# Patient Record
Sex: MALE | Race: ASIAN | NOT HISPANIC OR LATINO | Employment: FULL TIME | URBAN - METROPOLITAN AREA
[De-identification: names, ages, dates, MRNs, and addresses within clinical notes are randomized per-mention and may not be internally consistent; named-entity substitution may affect disease eponyms.]

---

## 2017-01-05 ENCOUNTER — RECORDS - HEALTHEAST (OUTPATIENT)
Dept: LAB | Facility: CLINIC | Age: 52
End: 2017-01-05

## 2017-01-05 LAB
CHOLEST SERPL-MCNC: 243 MG/DL
FASTING STATUS PATIENT QL REPORTED: YES
HDLC SERPL-MCNC: 38 MG/DL
LDLC SERPL CALC-MCNC: 186 MG/DL
TRIGL SERPL-MCNC: 95 MG/DL

## 2018-02-16 ENCOUNTER — RECORDS - HEALTHEAST (OUTPATIENT)
Dept: LAB | Facility: CLINIC | Age: 53
End: 2018-02-16

## 2018-02-16 LAB
CHOLEST SERPL-MCNC: 258 MG/DL
FASTING STATUS PATIENT QL REPORTED: YES
HDLC SERPL-MCNC: 41 MG/DL
LDLC SERPL CALC-MCNC: 197 MG/DL
PSA SERPL-MCNC: 0.6 NG/ML (ref 0–3.5)
TRIGL SERPL-MCNC: 98 MG/DL

## 2019-04-23 ENCOUNTER — RECORDS - HEALTHEAST (OUTPATIENT)
Dept: ADMINISTRATIVE | Facility: OTHER | Age: 54
End: 2019-04-23

## 2019-12-27 ENCOUNTER — RECORDS - HEALTHEAST (OUTPATIENT)
Dept: LAB | Facility: CLINIC | Age: 54
End: 2019-12-27

## 2019-12-27 LAB
ALBUMIN SERPL-MCNC: 4.3 G/DL (ref 3.5–5)
ALP SERPL-CCNC: 85 U/L (ref 45–120)
ALT SERPL W P-5'-P-CCNC: 31 U/L (ref 0–45)
ANION GAP SERPL CALCULATED.3IONS-SCNC: 11 MMOL/L (ref 5–18)
AST SERPL W P-5'-P-CCNC: 24 U/L (ref 0–40)
BILIRUB SERPL-MCNC: 0.6 MG/DL (ref 0–1)
BUN SERPL-MCNC: 10 MG/DL (ref 8–22)
CALCIUM SERPL-MCNC: 9.3 MG/DL (ref 8.5–10.5)
CHLORIDE BLD-SCNC: 106 MMOL/L (ref 98–107)
CHOLEST SERPL-MCNC: 203 MG/DL
CO2 SERPL-SCNC: 23 MMOL/L (ref 22–31)
CREAT SERPL-MCNC: 0.76 MG/DL (ref 0.7–1.3)
FASTING STATUS PATIENT QL REPORTED: YES
GFR SERPL CREATININE-BSD FRML MDRD: >60 ML/MIN/1.73M2
GLUCOSE BLD-MCNC: 83 MG/DL (ref 70–125)
HDLC SERPL-MCNC: 39 MG/DL
LDLC SERPL CALC-MCNC: 151 MG/DL
POTASSIUM BLD-SCNC: 4.8 MMOL/L (ref 3.5–5)
PROT SERPL-MCNC: 6.7 G/DL (ref 6–8)
SODIUM SERPL-SCNC: 140 MMOL/L (ref 136–145)
TRIGL SERPL-MCNC: 63 MG/DL

## 2021-02-01 ENCOUNTER — RECORDS - HEALTHEAST (OUTPATIENT)
Dept: LAB | Facility: CLINIC | Age: 56
End: 2021-02-01

## 2021-02-01 LAB
ALBUMIN SERPL-MCNC: 4.2 G/DL (ref 3.5–5)
ALP SERPL-CCNC: 88 U/L (ref 45–120)
ALT SERPL W P-5'-P-CCNC: 23 U/L (ref 0–45)
ANION GAP SERPL CALCULATED.3IONS-SCNC: 9 MMOL/L (ref 5–18)
AST SERPL W P-5'-P-CCNC: 25 U/L (ref 0–40)
BILIRUB SERPL-MCNC: 0.3 MG/DL (ref 0–1)
BUN SERPL-MCNC: 10 MG/DL (ref 8–22)
CALCIUM SERPL-MCNC: 8.7 MG/DL (ref 8.5–10.5)
CHLORIDE BLD-SCNC: 106 MMOL/L (ref 98–107)
CHOLEST SERPL-MCNC: 197 MG/DL
CO2 SERPL-SCNC: 23 MMOL/L (ref 22–31)
CREAT SERPL-MCNC: 0.73 MG/DL (ref 0.7–1.3)
FASTING STATUS PATIENT QL REPORTED: YES
GFR SERPL CREATININE-BSD FRML MDRD: >60 ML/MIN/1.73M2
GLUCOSE BLD-MCNC: 88 MG/DL (ref 70–125)
HDLC SERPL-MCNC: 37 MG/DL
LDLC SERPL CALC-MCNC: 148 MG/DL
POTASSIUM BLD-SCNC: 4 MMOL/L (ref 3.5–5)
PROT SERPL-MCNC: 6.5 G/DL (ref 6–8)
PSA SERPL-MCNC: 0.5 NG/ML (ref 0–3.5)
SODIUM SERPL-SCNC: 138 MMOL/L (ref 136–145)
TRIGL SERPL-MCNC: 58 MG/DL

## 2021-02-02 LAB — 25(OH)D3 SERPL-MCNC: 35.3 NG/ML (ref 30–80)

## 2021-04-09 ENCOUNTER — IMMUNIZATION (OUTPATIENT)
Dept: NURSING | Facility: CLINIC | Age: 56
End: 2021-04-09
Payer: COMMERCIAL

## 2021-04-09 PROCEDURE — 0001A PR COVID VAC PFIZER DIL RECON 30 MCG/0.3 ML IM: CPT

## 2021-04-09 PROCEDURE — 91300 PR COVID VAC PFIZER DIL RECON 30 MCG/0.3 ML IM: CPT

## 2021-04-30 ENCOUNTER — IMMUNIZATION (OUTPATIENT)
Dept: NURSING | Facility: CLINIC | Age: 56
End: 2021-04-30
Attending: INTERNAL MEDICINE
Payer: COMMERCIAL

## 2021-04-30 PROCEDURE — 0002A PR COVID VAC PFIZER DIL RECON 30 MCG/0.3 ML IM: CPT

## 2021-04-30 PROCEDURE — 91300 PR COVID VAC PFIZER DIL RECON 30 MCG/0.3 ML IM: CPT

## 2021-05-01 ENCOUNTER — HEALTH MAINTENANCE LETTER (OUTPATIENT)
Age: 56
End: 2021-05-01

## 2021-05-29 ENCOUNTER — RECORDS - HEALTHEAST (OUTPATIENT)
Dept: ADMINISTRATIVE | Facility: CLINIC | Age: 56
End: 2021-05-29

## 2021-06-16 PROBLEM — E78.00 HYPERCHOLESTEROLEMIA: Status: ACTIVE | Noted: 2019-04-18

## 2021-06-16 PROBLEM — Z86.59 HISTORY OF DEPRESSION: Status: ACTIVE | Noted: 2019-04-18

## 2021-10-11 ENCOUNTER — HEALTH MAINTENANCE LETTER (OUTPATIENT)
Age: 56
End: 2021-10-11

## 2022-05-22 ENCOUNTER — HEALTH MAINTENANCE LETTER (OUTPATIENT)
Age: 57
End: 2022-05-22

## 2022-06-13 PROCEDURE — 80061 LIPID PANEL: CPT | Mod: ORL | Performed by: FAMILY MEDICINE

## 2022-06-13 PROCEDURE — 82306 VITAMIN D 25 HYDROXY: CPT | Mod: ORL | Performed by: FAMILY MEDICINE

## 2022-06-13 PROCEDURE — 80053 COMPREHEN METABOLIC PANEL: CPT | Performed by: FAMILY MEDICINE

## 2022-06-13 PROCEDURE — 82310 ASSAY OF CALCIUM: CPT | Performed by: FAMILY MEDICINE

## 2022-06-13 PROCEDURE — G0103 PSA SCREENING: HCPCS | Mod: ORL | Performed by: FAMILY MEDICINE

## 2022-06-13 PROCEDURE — 80053 COMPREHEN METABOLIC PANEL: CPT | Mod: ORL | Performed by: FAMILY MEDICINE

## 2022-06-13 PROCEDURE — G0103 PSA SCREENING: HCPCS | Performed by: FAMILY MEDICINE

## 2022-06-13 PROCEDURE — 82306 VITAMIN D 25 HYDROXY: CPT | Performed by: FAMILY MEDICINE

## 2022-06-14 ENCOUNTER — LAB REQUISITION (OUTPATIENT)
Dept: LAB | Facility: CLINIC | Age: 57
End: 2022-06-14
Payer: COMMERCIAL

## 2022-06-14 DIAGNOSIS — Z12.5 ENCOUNTER FOR SCREENING FOR MALIGNANT NEOPLASM OF PROSTATE: ICD-10-CM

## 2022-06-14 DIAGNOSIS — E78.5 HYPERLIPIDEMIA, UNSPECIFIED: ICD-10-CM

## 2022-06-14 DIAGNOSIS — Z13.21 ENCOUNTER FOR SCREENING FOR NUTRITIONAL DISORDER: ICD-10-CM

## 2022-06-14 LAB
ALBUMIN SERPL-MCNC: 4.3 G/DL (ref 3.5–5)
ALP SERPL-CCNC: 84 U/L (ref 45–120)
ALT SERPL W P-5'-P-CCNC: 20 U/L (ref 0–45)
ANION GAP SERPL CALCULATED.3IONS-SCNC: 13 MMOL/L (ref 5–18)
AST SERPL W P-5'-P-CCNC: 21 U/L (ref 0–40)
BILIRUB SERPL-MCNC: 0.7 MG/DL (ref 0–1)
BUN SERPL-MCNC: 12 MG/DL (ref 8–22)
CALCIUM SERPL-MCNC: 9.3 MG/DL (ref 8.5–10.5)
CHLORIDE BLD-SCNC: 105 MMOL/L (ref 98–107)
CO2 SERPL-SCNC: 22 MMOL/L (ref 22–31)
CREAT SERPL-MCNC: 0.73 MG/DL (ref 0.7–1.3)
DEPRECATED CALCIDIOL+CALCIFEROL SERPL-MC: 19 UG/L (ref 20–75)
GFR SERPL CREATININE-BSD FRML MDRD: >90 ML/MIN/1.73M2
GLUCOSE BLD-MCNC: 85 MG/DL (ref 70–125)
POTASSIUM BLD-SCNC: 3.9 MMOL/L (ref 3.5–5)
PROT SERPL-MCNC: 7.1 G/DL (ref 6–8)
PSA SERPL-MCNC: 0.51 UG/L (ref 0–3.5)
SODIUM SERPL-SCNC: 140 MMOL/L (ref 136–145)

## 2022-06-17 ENCOUNTER — LAB REQUISITION (OUTPATIENT)
Dept: LAB | Facility: CLINIC | Age: 57
End: 2022-06-17
Payer: COMMERCIAL

## 2022-06-17 DIAGNOSIS — E78.5 HYPERLIPIDEMIA, UNSPECIFIED: ICD-10-CM

## 2022-06-17 LAB
CHOLEST SERPL-MCNC: 227 MG/DL
HDLC SERPL-MCNC: 45 MG/DL
LDLC SERPL CALC-MCNC: 163 MG/DL
TRIGL SERPL-MCNC: 94 MG/DL

## 2022-09-25 ENCOUNTER — HEALTH MAINTENANCE LETTER (OUTPATIENT)
Age: 57
End: 2022-09-25

## 2023-03-24 ENCOUNTER — LAB REQUISITION (OUTPATIENT)
Dept: LAB | Facility: CLINIC | Age: 58
End: 2023-03-24
Payer: COMMERCIAL

## 2023-03-24 DIAGNOSIS — E55.9 VITAMIN D DEFICIENCY, UNSPECIFIED: ICD-10-CM

## 2023-03-24 DIAGNOSIS — E78.5 HYPERLIPIDEMIA, UNSPECIFIED: ICD-10-CM

## 2023-03-24 LAB
ALBUMIN SERPL BCG-MCNC: 4.8 G/DL (ref 3.5–5.2)
ALP SERPL-CCNC: 92 U/L (ref 40–129)
ALT SERPL W P-5'-P-CCNC: 50 U/L (ref 10–50)
ANION GAP SERPL CALCULATED.3IONS-SCNC: 13 MMOL/L (ref 7–15)
AST SERPL W P-5'-P-CCNC: 33 U/L (ref 10–50)
BILIRUB SERPL-MCNC: 0.6 MG/DL
BUN SERPL-MCNC: 12.4 MG/DL (ref 6–20)
CALCIUM SERPL-MCNC: 9.5 MG/DL (ref 8.6–10)
CHLORIDE SERPL-SCNC: 104 MMOL/L (ref 98–107)
CHOLEST SERPL-MCNC: 149 MG/DL
CREAT SERPL-MCNC: 0.75 MG/DL (ref 0.67–1.17)
DEPRECATED HCO3 PLAS-SCNC: 24 MMOL/L (ref 22–29)
GFR SERPL CREATININE-BSD FRML MDRD: >90 ML/MIN/1.73M2
GLUCOSE SERPL-MCNC: 88 MG/DL (ref 70–99)
HDLC SERPL-MCNC: 43 MG/DL
LDLC SERPL CALC-MCNC: 88 MG/DL
NONHDLC SERPL-MCNC: 106 MG/DL
POTASSIUM SERPL-SCNC: 4 MMOL/L (ref 3.4–5.3)
PROT SERPL-MCNC: 7.1 G/DL (ref 6.4–8.3)
SODIUM SERPL-SCNC: 141 MMOL/L (ref 136–145)
TRIGL SERPL-MCNC: 92 MG/DL

## 2023-03-24 PROCEDURE — 80053 COMPREHEN METABOLIC PANEL: CPT | Mod: ORL | Performed by: FAMILY MEDICINE

## 2023-03-24 PROCEDURE — 80061 LIPID PANEL: CPT | Mod: ORL | Performed by: FAMILY MEDICINE

## 2023-03-24 PROCEDURE — 82306 VITAMIN D 25 HYDROXY: CPT | Mod: ORL | Performed by: FAMILY MEDICINE

## 2023-03-25 LAB — DEPRECATED CALCIDIOL+CALCIFEROL SERPL-MC: 23 UG/L (ref 20–75)

## 2023-06-04 ENCOUNTER — HEALTH MAINTENANCE LETTER (OUTPATIENT)
Age: 58
End: 2023-06-04

## 2023-08-18 ENCOUNTER — TRANSFERRED RECORDS (OUTPATIENT)
Dept: HEALTH INFORMATION MANAGEMENT | Facility: CLINIC | Age: 58
End: 2023-08-18

## 2023-12-19 ENCOUNTER — LAB REQUISITION (OUTPATIENT)
Dept: LAB | Facility: CLINIC | Age: 58
End: 2023-12-19
Payer: COMMERCIAL

## 2023-12-19 DIAGNOSIS — Z13.29 ENCOUNTER FOR SCREENING FOR OTHER SUSPECTED ENDOCRINE DISORDER: ICD-10-CM

## 2023-12-19 LAB
T4 FREE SERPL-MCNC: 1.1 NG/DL (ref 0.9–1.7)
TSH SERPL DL<=0.005 MIU/L-ACNC: 9.11 UIU/ML (ref 0.3–4.2)

## 2023-12-19 PROCEDURE — 84443 ASSAY THYROID STIM HORMONE: CPT | Mod: ORL | Performed by: FAMILY MEDICINE

## 2023-12-19 PROCEDURE — 84439 ASSAY OF FREE THYROXINE: CPT | Mod: ORL | Performed by: FAMILY MEDICINE

## 2024-02-12 ENCOUNTER — LAB REQUISITION (OUTPATIENT)
Dept: LAB | Facility: CLINIC | Age: 59
End: 2024-02-12
Payer: COMMERCIAL

## 2024-02-12 DIAGNOSIS — E03.8 OTHER SPECIFIED HYPOTHYROIDISM: ICD-10-CM

## 2024-02-12 DIAGNOSIS — E55.9 VITAMIN D DEFICIENCY, UNSPECIFIED: ICD-10-CM

## 2024-02-12 DIAGNOSIS — M79.18 MYALGIA, OTHER SITE: ICD-10-CM

## 2024-02-12 LAB
ANION GAP SERPL CALCULATED.3IONS-SCNC: 11 MMOL/L (ref 7–15)
BUN SERPL-MCNC: 9.4 MG/DL (ref 6–20)
CALCIUM SERPL-MCNC: 9.2 MG/DL (ref 8.6–10)
CHLORIDE SERPL-SCNC: 104 MMOL/L (ref 98–107)
CREAT SERPL-MCNC: 0.71 MG/DL (ref 0.67–1.17)
DEPRECATED HCO3 PLAS-SCNC: 25 MMOL/L (ref 22–29)
EGFRCR SERPLBLD CKD-EPI 2021: >90 ML/MIN/1.73M2
GLUCOSE SERPL-MCNC: 69 MG/DL (ref 70–99)
MAGNESIUM SERPL-MCNC: 2.3 MG/DL (ref 1.7–2.3)
POTASSIUM SERPL-SCNC: 3.9 MMOL/L (ref 3.4–5.3)
SODIUM SERPL-SCNC: 140 MMOL/L (ref 135–145)
T4 FREE SERPL-MCNC: 1.6 NG/DL (ref 0.9–1.7)
TSH SERPL DL<=0.005 MIU/L-ACNC: 2.12 UIU/ML (ref 0.3–4.2)
VIT B12 SERPL-MCNC: 1057 PG/ML (ref 232–1245)
VIT D+METAB SERPL-MCNC: 27 NG/ML (ref 20–50)

## 2024-02-12 PROCEDURE — 80048 BASIC METABOLIC PNL TOTAL CA: CPT | Mod: ORL | Performed by: FAMILY MEDICINE

## 2024-02-12 PROCEDURE — 82306 VITAMIN D 25 HYDROXY: CPT | Mod: ORL | Performed by: FAMILY MEDICINE

## 2024-02-12 PROCEDURE — 83735 ASSAY OF MAGNESIUM: CPT | Mod: ORL | Performed by: FAMILY MEDICINE

## 2024-02-12 PROCEDURE — 82607 VITAMIN B-12: CPT | Mod: ORL | Performed by: FAMILY MEDICINE

## 2024-02-12 PROCEDURE — 84439 ASSAY OF FREE THYROXINE: CPT | Mod: ORL | Performed by: FAMILY MEDICINE

## 2024-02-12 PROCEDURE — 84443 ASSAY THYROID STIM HORMONE: CPT | Mod: ORL | Performed by: FAMILY MEDICINE

## 2024-03-11 ENCOUNTER — TRANSFERRED RECORDS (OUTPATIENT)
Dept: HEALTH INFORMATION MANAGEMENT | Facility: CLINIC | Age: 59
End: 2024-03-11
Payer: COMMERCIAL

## 2024-03-15 ENCOUNTER — MEDICAL CORRESPONDENCE (OUTPATIENT)
Dept: HEALTH INFORMATION MANAGEMENT | Facility: CLINIC | Age: 59
End: 2024-03-15
Payer: COMMERCIAL

## 2024-03-18 ENCOUNTER — TRANSCRIBE ORDERS (OUTPATIENT)
Dept: OTHER | Age: 59
End: 2024-03-18

## 2024-03-18 DIAGNOSIS — G51.39 FACIAL SPASM: Primary | ICD-10-CM

## 2024-03-21 ENCOUNTER — LAB REQUISITION (OUTPATIENT)
Dept: LAB | Facility: CLINIC | Age: 59
End: 2024-03-21
Payer: COMMERCIAL

## 2024-03-21 DIAGNOSIS — F41.1 GENERALIZED ANXIETY DISORDER: ICD-10-CM

## 2024-03-21 PROCEDURE — 80053 COMPREHEN METABOLIC PANEL: CPT | Mod: ORL | Performed by: FAMILY MEDICINE

## 2024-03-22 LAB
ALBUMIN SERPL BCG-MCNC: 4.6 G/DL (ref 3.5–5.2)
ALP SERPL-CCNC: 85 U/L (ref 40–150)
ALT SERPL W P-5'-P-CCNC: 37 U/L (ref 0–70)
ANION GAP SERPL CALCULATED.3IONS-SCNC: 13 MMOL/L (ref 7–15)
AST SERPL W P-5'-P-CCNC: 27 U/L (ref 0–45)
BILIRUB SERPL-MCNC: 0.4 MG/DL
BUN SERPL-MCNC: 10 MG/DL (ref 6–20)
CALCIUM SERPL-MCNC: 8.9 MG/DL (ref 8.6–10)
CHLORIDE SERPL-SCNC: 103 MMOL/L (ref 98–107)
CREAT SERPL-MCNC: 0.69 MG/DL (ref 0.67–1.17)
DEPRECATED HCO3 PLAS-SCNC: 24 MMOL/L (ref 22–29)
EGFRCR SERPLBLD CKD-EPI 2021: >90 ML/MIN/1.73M2
GLUCOSE SERPL-MCNC: 78 MG/DL (ref 70–99)
POTASSIUM SERPL-SCNC: 3.7 MMOL/L (ref 3.4–5.3)
PROT SERPL-MCNC: 6.9 G/DL (ref 6.4–8.3)
SODIUM SERPL-SCNC: 140 MMOL/L (ref 135–145)

## 2024-03-30 NOTE — PROGRESS NOTES
NEUROLOGY CONSULTATION NOTE       [facility] [place]  [address] [place] [address]  Tel: [phone]  Fax: [phone]  [email or web address]     Augusto Moon,  1965, MRN [id number]  PCP: Kingsley Cesar  Date: 2024     ASSESSMENT & PLAN     Visit Diagnosis   Benign fasciculations  Chronic tension-type headache, not intractable     Chronic tension-type headache  58 years old male with history of HLD, depression, head injury who was referred for evaluation of headache along with eye fasciculation.  He clearly has chronic tension type headache due to his underlying depression/anxiety resulting in fasciculations also.  I have reassured him he does not have any major neurological illness but given his history of head injury with encephalomalacia noted on recent MRI scan I have recommended:    1.  Sleep deprived EEG  2.  Start Effexor 37.5 mg daily.  I will titrate the dose depending on his response  3.  Follow-up the day scheduled for EEG    Thank you again for this referral, please feel free to contact me if you have any questions.    Ulices Larsen MD  [facility], [place]     REASON FOR CONSULTATION Facial/eye Spasm Evaluation        HISTORY OF PRESENT ILLNESS     We have been requested by Dr. Cesar to evaluate Augusto Moon who is a 58 year old  male for facial spasm    Patient is a 58 years old [place] male with history of HLD, depression, head injury who was referred for evaluation of pressure-like sensation in the back of the head along with left facial twitches.  According to patient while in [place] years ago he had a fall resulting in subdural and epidural hematoma.  This was managed conservatively.  He eventually immigrated to [place].  He was on anticonvulsant for short duration that he discontinued.    Recently he started having increased right-sided pressure-like sensation usually brought on by working on the computer.  He also  noticed some tingling sensation on the left forehead along with twitching in the upper eyelid.  He took riboflavin and magnesium that did help but continues to have intermittent twitches on the left eyelid.  He denies any tonic-clonic activity, focal weakness or numbness.  There is no history of any aura or increased weakness in the arms or legs.  Due to ongoing symptoms he had CT of the head and neck that showed no recent ischemia.  Chronic encephalomalacia along the lateral left frontal and temporal lobes was noted likely due to the old trauma.  MRI brain also confirmed encephalomalacia involving the inferior lateral left frontal lobe and ventrolateral left temporal lobe due to previous trauma.  Additionally patient had scattered areas of punctate white matter T2 hyperintensity throughout the cerebral hemisphere     PROBLEM LIST   Patient Active Problem List   Diagnosis Code     Depression with anxiety F41.8     Hypercholesterolemia E78.00     Chronic tension-type headache, not intractable G44.229         PAST MEDICAL & SURGICAL HISTORY     Past Medical History:   Patient  has no past medical history on file.    Surgical History:  He  has no past surgical history on file.     SOCIAL HISTORY     Reviewed, and he  reports that he has never smoked. He does not have any smokeless tobacco history on file. He reports that he does not currently use alcohol.     FAMILY HISTORY     Reviewed, and family history is not on file.     ALLERGIES     Allergies   Allergen Reactions     Sulfa Antibiotics Rash         REVIEW OF SYSTEMS     A 12 point review of system was performed and was negative except as outlined in the history of present illness.     HOME MEDICATIONS     Current Outpatient Rx   Medication Sig Dispense Refill     Cyanocobalamin (VITAMIN B12 PO)        levothyroxine (SYNTHROID/LEVOTHROID) 50 MCG tablet Take 50 mcg by mouth daily       Omega-3 Fatty Acids (FISH OIL PO)        rosuvastatin (CRESTOR) 5 MG tablet Take  "5 mg by mouth daily       venlafaxine (EFFEXOR XR) 37.5 MG 24 hr capsule Take 1 capsule (37.5 mg) by mouth daily 30 capsule 6         PHYSICAL EXAM     Vital signs  /87 (BP Location: Left arm, Patient Position: Sitting)   Pulse 83   Ht 1.651 m (5' 5\")   Wt 63.5 kg (140 lb)   BMI 23.30 kg/m      Weight:   140 lbs 0 oz    Middle-age male who is alert and oriented somewhat varied vital signs are reviewed and documented in electronic medical record.  Neck supple.  Neurologically speech was normal.  Cranial nerves II through XII are intact.  Motor strength 5/5.  Reflexes 2+ toes downgoing.  No dysmetria noted on finger-nose testing.  Gait normal.  Romberg negative.     PERTINENT DIAGNOSTIC STUDIES     Following studies were reviewed:     CTA HEAD AND NECK 8/12/2023  HEAD CT:   1.  No intracranial hemorrhage, mass, or definite CT evidence of recent ischemia.   2.  Chronic encephalomalacia along the lateral left frontal and temporal lobes may reflect the sequela of old trauma.     HEAD CTA:   1.  Negative. No vessel stenosis, occlusion or aneurysm.     NECK CTA:   1.  Negative. No dissection or hemodynamically significant narrowing in the neck by NASCET criteria.     MRI BRAIN 8/28/2023  1. Unchanged areas of macrocystic encephalomalacia involving the inferolateral left frontal lobe and ventrolateral left temporal lobe as detailed above, likely the chronic sequelae of prior trauma.    2. No intracranial mass, acute intracranial hemorrhage or acute or subacute infarction.    3. Scattered areas of punctate white matter T2 hyperintensity throughout the remainder the cerebral hemispheres are nonspecific, but likely reflect mild migraine hepatic change.    4. Mild bilateral maxillary sinus, bilateral sphenoid sinus, bilateral ethmoid air cell and left frontal sinus mucosal thickening with a right maxillary sinus mucous retention cysts.    5. Upper cervical spinal degenerative spondylosis.    MRI THORACIC SPINE " 5/5/2018  1. Mild to moderate multilevel thoracic disc degeneration with moderate discogenic marrow edema at T8-9.    2. No disc herniation or facet arthropathy, and no stenosis or impingement.    3. No cord abnormality, and no neoplasm, fracture or infection.    MRI LUMBAR SPINE 5/5/2018  1. Mild to moderate multilevel lumbar disc degeneration with endplate irregularities and discogenic marrow edema at L4-5.    2. Central canal patent at each level with subarticular recess stenosis on the left at L3-4.    3. No intradural abnormality, and no neoplasm, fracture or infection.         PERTINENT LABS  Following labs were reviewed:  Lab Requisition on 03/21/2024   Component Date Value Ref Range Status     Sodium 03/21/2024 140  135 - 145 mmol/L Final     Potassium 03/21/2024 3.7  3.4 - 5.3 mmol/L Final     Carbon Dioxide (CO2) 03/21/2024 24  22 - 29 mmol/L Final     Anion Gap 03/21/2024 13  7 - 15 mmol/L Final     Urea Nitrogen 03/21/2024 10.0  6.0 - 20.0 mg/dL Final     Creatinine 03/21/2024 0.69  0.67 - 1.17 mg/dL Final     GFR Estimate 03/21/2024 >90  >60 mL/min/1.73m2 Final     Calcium 03/21/2024 8.9  8.6 - 10.0 mg/dL Final     Chloride 03/21/2024 103  98 - 107 mmol/L Final     Glucose 03/21/2024 78  70 - 99 mg/dL Final     Alkaline Phosphatase 03/21/2024 85  40 - 150 U/L Final     AST 03/21/2024 27  0 - 45 U/L Final     ALT 03/21/2024 37  0 - 70 U/L Final     Protein Total 03/21/2024 6.9  6.4 - 8.3 g/dL Final     Albumin 03/21/2024 4.6  3.5 - 5.2 g/dL Final     Bilirubin Total 03/21/2024 0.4  <=1.2 mg/dL Final   Lab Requisition on 02/12/2024   Component Date Value Ref Range Status     Sodium 02/12/2024 140  135 - 145 mmol/L Final     Potassium 02/12/2024 3.9  3.4 - 5.3 mmol/L Final     Chloride 02/12/2024 104  98 - 107 mmol/L Final     Carbon Dioxide (CO2) 02/12/2024 25  22 - 29 mmol/L Final     Anion Gap 02/12/2024 11  7 - 15 mmol/L Final     Urea Nitrogen 02/12/2024 9.4  6.0 - 20.0 mg/dL Final     Creatinine  02/12/2024 0.71  0.67 - 1.17 mg/dL Final     GFR Estimate 02/12/2024 >90  >60 mL/min/1.73m2 Final     Calcium 02/12/2024 9.2  8.6 - 10.0 mg/dL Final     Glucose 02/12/2024 69 (L)  70 - 99 mg/dL Final     Magnesium 02/12/2024 2.3  1.7 - 2.3 mg/dL Final     TSH 02/12/2024 2.12  0.30 - 4.20 uIU/mL Final     Free T4 02/12/2024 1.60  0.90 - 1.70 ng/dL Final     Vitamin B12 02/12/2024 1,057  232 - 1,245 pg/mL Final     Vitamin D, Total (25-Hydroxy) 02/12/2024 27  20 - 50 ng/mL Final        Total time spent for face to face visit, reviewing labs/imaging studies, counseling and coordination of care was: 1 Hour spent on the date of the encounter doing chart review, review of outside records, review of test results, interpretation of tests, patient visit, and documentation     This note was dictated using voice recognition software.  Any grammatical or context distortions are unintentional and inherent to the software.    Orders Placed This Encounter   Procedures     EEG      New Prescriptions    VENLAFAXINE (EFFEXOR XR) 37.5 MG 24 HR CAPSULE    Take 1 capsule (37.5 mg) by mouth daily      Modified Medications    No medications on file

## 2024-04-01 ENCOUNTER — OFFICE VISIT (OUTPATIENT)
Dept: NEUROLOGY | Facility: CLINIC | Age: 59
End: 2024-04-01
Attending: FAMILY MEDICINE
Payer: COMMERCIAL

## 2024-04-01 VITALS
DIASTOLIC BLOOD PRESSURE: 87 MMHG | HEART RATE: 83 BPM | HEIGHT: 65 IN | BODY MASS INDEX: 23.32 KG/M2 | SYSTOLIC BLOOD PRESSURE: 130 MMHG | WEIGHT: 140 LBS

## 2024-04-01 DIAGNOSIS — G44.229 CHRONIC TENSION-TYPE HEADACHE, NOT INTRACTABLE: ICD-10-CM

## 2024-04-01 DIAGNOSIS — R25.3 BENIGN FASCICULATIONS: Primary | ICD-10-CM

## 2024-04-01 PROBLEM — F41.8 DEPRESSION WITH ANXIETY: Status: ACTIVE | Noted: 2019-04-18

## 2024-04-01 PROCEDURE — 99205 OFFICE O/P NEW HI 60 MIN: CPT | Performed by: PSYCHIATRY & NEUROLOGY

## 2024-04-01 RX ORDER — VENLAFAXINE HYDROCHLORIDE 37.5 MG/1
37.5 CAPSULE, EXTENDED RELEASE ORAL DAILY
Qty: 30 CAPSULE | Refills: 6 | Status: SHIPPED | OUTPATIENT
Start: 2024-04-01 | End: 2024-05-28

## 2024-04-01 RX ORDER — ROSUVASTATIN CALCIUM 5 MG/1
5 TABLET, COATED ORAL DAILY
COMMUNITY
Start: 2023-03-27

## 2024-04-01 RX ORDER — LEVOTHYROXINE SODIUM 50 UG/1
50 TABLET ORAL DAILY
COMMUNITY

## 2024-04-01 NOTE — LETTER
2024         RE: Augusto Moon  [address] [place] [address]        Dear Colleague,    Thank you for referring your patient, Augusto Moon, to the [facility] [place]. Please see a copy of my visit note below.    NEUROLOGY CONSULTATION NOTE       [facility] [place]  [address] [place] [address]  Tel: [phone]  Fax: [phone]  [email or web address]     Augusto Moon,  1965, MRN [id number]  PCP: Kingsley Cesar  Date: 2024     ASSESSMENT & PLAN     Visit Diagnosis   Benign fasciculations  Chronic tension-type headache, not intractable     Chronic tension-type headache  58 years old male with history of HLD, depression, head injury who was referred for evaluation of headache along with eye fasciculation.  He clearly has chronic tension type headache due to his underlying depression/anxiety resulting in fasciculations also.  I have reassured him he does not have any major neurological illness but given his history of head injury with encephalomalacia noted on recent MRI scan I have recommended:    1.  Sleep deprived EEG  2.  Start Effexor 37.5 mg daily.  I will titrate the dose depending on his response  3.  Follow-up the day scheduled for EEG    Thank you again for this referral, please feel free to contact me if you have any questions.    Uilces Larsen MD  [facility], [place]     REASON FOR CONSULTATION Facial/eye Spasm Evaluation        HISTORY OF PRESENT ILLNESS     We have been requested by Dr. Cesar to evaluate Augusto Moon who is a 58 year old  male for facial spasm    Patient is a 58 years old [place]er male with history of HLD, depression, head injury who was referred for evaluation of pressure-like sensation in the back of the head along with left facial twitches.  According to patient while in [place] years ago he had a fall resulting in subdural and epidural hematoma.  This was managed  conservatively.  He eventually immig[place] to [place].  He was on anticonvulsant for short duration that he discontinued.    Recently he started having increased right-sided pressure-like sensation usually brought on by working on the computer.  He also noticed some tingling sensation on the left forehead along with twitching in the upper eyelid.  He took riboflavin and magnesium that did help but continues to have intermittent twitches on the left eyelid.  He denies any tonic-clonic activity, focal weakness or numbness.  There is no history of any aura or increased weakness in the arms or legs.  Due to ongoing symptoms he had CT of the head and neck that showed no recent ischemia.  Chronic encephalomalacia along the lateral left frontal and temporal lobes was noted likely due to the old trauma.  MRI brain also confirmed encephalomalacia involving the inferior lateral left frontal lobe and ventrolateral left temporal lobe due to previous trauma.  Additionally patient had scattered areas of punctate white matter T2 hyperintensity throughout the cerebral hemisphere     PROBLEM LIST   Patient Active Problem List   Diagnosis Code     Depression with anxiety F41.8     Hypercholesterolemia E78.00     Chronic tension-type headache, not intractable G44.229         PAST MEDICAL & SURGICAL HISTORY     Past Medical History:   Patient  has no past medical history on file.    Surgical History:  He  has no past surgical history on file.     SOCIAL HISTORY     Reviewed, and he  reports that he has never smoked. He does not have any smokeless tobacco history on file. He reports that he does not currently use alcohol.     FAMILY HISTORY     Reviewed, and family history is not on file.     ALLERGIES     Allergies   Allergen Reactions     Sulfa Antibiotics Rash         REVIEW OF SYSTEMS     A 12 point review of system was performed and was negative except as outlined in the history of present illness.     HOME MEDICATIONS  "    Current Outpatient Rx   Medication Sig Dispense Refill     Cyanocobalamin (VITAMIN B12 PO)        levothyroxine (SYNTHROID/LEVOTHROID) 50 MCG tablet Take 50 mcg by mouth daily       Omega-3 Fatty Acids (FISH OIL PO)        rosuvastatin (CRESTOR) 5 MG tablet Take 5 mg by mouth daily       venlafaxine (EFFEXOR XR) 37.5 MG 24 hr capsule Take 1 capsule (37.5 mg) by mouth daily 30 capsule 6         PHYSICAL EXAM     Vital signs  /87 (BP Location: Left arm, Patient Position: Sitting)   Pulse 83   Ht 1.651 m (5' 5\")   Wt 63.5 kg (140 lb)   BMI 23.30 kg/m      Weight:   140 lbs 0 oz    Middle-age male who is alert and oriented somewhat varied vital signs are reviewed and documented in electronic medical record.  Neck supple.  Neurologically speech was normal.  Cranial nerves II through XII are intact.  Motor strength 5/5.  Reflexes 2+ toes downgoing.  No dysmetria noted on finger-nose testing.  Gait normal.  Romberg negative.     PERTINENT DIAGNOSTIC STUDIES     Following studies were reviewed:     CTA HEAD AND NECK 8/12/2023  HEAD CT:   1.  No intracranial hemorrhage, mass, or definite CT evidence of recent ischemia.   2.  Chronic encephalomalacia along the lateral left frontal and temporal lobes may reflect the sequela of old trauma.     HEAD CTA:   1.  Negative. No vessel stenosis, occlusion or aneurysm.     NECK CTA:   1.  Negative. No dissection or hemodynamically significant narrowing in the neck by NASCET criteria.     MRI BRAIN 8/28/2023  1. Unchanged areas of macrocystic encephalomalacia involving the inferolateral left frontal lobe and ventrolateral left temporal lobe as detailed above, likely the chronic sequelae of prior trauma.    2. No intracranial mass, acute intracranial hemorrhage or acute or subacute infarction.    3. Scattered areas of punctate white matter T2 hyperintensity throughout the remainder the cerebral hemispheres are nonspecific, but likely reflect mild migraine hepatic " change.    4. Mild bilateral maxillary sinus, bilateral sphenoid sinus, bilateral ethmoid air cell and left frontal sinus mucosal thickening with a right maxillary sinus mucous retention cysts.    5. Upper cervical spinal degenerative spondylosis.    MRI THORACIC SPINE 5/5/2018  1. Mild to moderate multilevel thoracic disc degeneration with moderate discogenic marrow edema at T8-9.    2. No disc herniation or facet arthropathy, and no stenosis or impingement.    3. No cord abnormality, and no neoplasm, fracture or infection.    MRI LUMBAR SPINE 5/5/2018  1. Mild to moderate multilevel lumbar disc degeneration with endplate irregularities and discogenic marrow edema at L4-5.    2. Central canal patent at each level with subarticular recess stenosis on the left at L3-4.    3. No intradural abnormality, and no neoplasm, fracture or infection.         PERTINENT LABS  Following labs were reviewed:  Lab Requisition on 03/21/2024   Component Date Value Ref Range Status     Sodium 03/21/2024 140  135 - 145 mmol/L Final     Potassium 03/21/2024 3.7  3.4 - 5.3 mmol/L Final     Carbon Dioxide (CO2) 03/21/2024 24  22 - 29 mmol/L Final     Anion Gap 03/21/2024 13  7 - 15 mmol/L Final     Urea Nitrogen 03/21/2024 10.0  6.0 - 20.0 mg/dL Final     Creatinine 03/21/2024 0.69  0.67 - 1.17 mg/dL Final     GFR Estimate 03/21/2024 >90  >60 mL/min/1.73m2 Final     Calcium 03/21/2024 8.9  8.6 - 10.0 mg/dL Final     Chloride 03/21/2024 103  98 - 107 mmol/L Final     Glucose 03/21/2024 78  70 - 99 mg/dL Final     Alkaline Phosphatase 03/21/2024 85  40 - 150 U/L Final     AST 03/21/2024 27  0 - 45 U/L Final     ALT 03/21/2024 37  0 - 70 U/L Final     Protein Total 03/21/2024 6.9  6.4 - 8.3 g/dL Final     Albumin 03/21/2024 4.6  3.5 - 5.2 g/dL Final     Bilirubin Total 03/21/2024 0.4  <=1.2 mg/dL Final   Lab Requisition on 02/12/2024   Component Date Value Ref Range Status     Sodium 02/12/2024 140  135 - 145 mmol/L Final     Potassium  02/12/2024 3.9  3.4 - 5.3 mmol/L Final     Chloride 02/12/2024 104  98 - 107 mmol/L Final     Carbon Dioxide (CO2) 02/12/2024 25  22 - 29 mmol/L Final     Anion Gap 02/12/2024 11  7 - 15 mmol/L Final     Urea Nitrogen 02/12/2024 9.4  6.0 - 20.0 mg/dL Final     Creatinine 02/12/2024 0.71  0.67 - 1.17 mg/dL Final     GFR Estimate 02/12/2024 >90  >60 mL/min/1.73m2 Final     Calcium 02/12/2024 9.2  8.6 - 10.0 mg/dL Final     Glucose 02/12/2024 69 (L)  70 - 99 mg/dL Final     Magnesium 02/12/2024 2.3  1.7 - 2.3 mg/dL Final     TSH 02/12/2024 2.12  0.30 - 4.20 uIU/mL Final     Free T4 02/12/2024 1.60  0.90 - 1.70 ng/dL Final     Vitamin B12 02/12/2024 1,057  232 - 1,245 pg/mL Final     Vitamin D, Total (25-Hydroxy) 02/12/2024 27  20 - 50 ng/mL Final        Total time spent for face to face visit, reviewing labs/imaging studies, counseling and coordination of care was: 1 Hour spent on the date of the encounter doing chart review, review of outside records, review of test results, interpretation of tests, patient visit, and documentation     This note was dictated using voice recognition software.  Any grammatical or context distortions are unintentional and inherent to the software.    Orders Placed This Encounter   Procedures     EEG      New Prescriptions    VENLAFAXINE (EFFEXOR XR) 37.5 MG 24 HR CAPSULE    Take 1 capsule (37.5 mg) by mouth daily      Modified Medications    No medications on file                Again, thank you for allowing me to participate in the care of your patient.        Sincerely,        Ulices Larsen MD

## 2024-04-01 NOTE — NURSING NOTE
Chief Complaint   Patient presents with    Facial/eye Spasm Evaluation     Patient reports left sided spasm in  the eye regions. Complains of sharp head pain on  the right side. Had 1-2 episodes of severe head pain that occurred at night and had to rest with an ice pack- he notices this when he is concentrating too hard. 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 Patient reports he did start acupuncture  about 3 weeks ago- not sure if this has been beneficial for him     Carmela Shukla CMA on 4/1/2024 at 2:01 PM  [facility]

## 2024-04-02 ENCOUNTER — ANCILLARY PROCEDURE (OUTPATIENT)
Dept: NEUROLOGY | Facility: CLINIC | Age: 59
End: 2024-04-02
Attending: PSYCHIATRY & NEUROLOGY
Payer: COMMERCIAL

## 2024-04-02 DIAGNOSIS — R25.3 BENIGN FASCICULATIONS: ICD-10-CM

## 2024-04-02 DIAGNOSIS — G44.229 CHRONIC TENSION-TYPE HEADACHE, NOT INTRACTABLE: ICD-10-CM

## 2024-04-02 PROCEDURE — 95816 EEG AWAKE AND DROWSY: CPT | Performed by: PSYCHIATRY & NEUROLOGY

## 2024-05-27 ENCOUNTER — MYC MEDICAL ADVICE (OUTPATIENT)
Dept: NEUROLOGY | Facility: CLINIC | Age: 59
End: 2024-05-27
Payer: COMMERCIAL

## 2024-05-27 DIAGNOSIS — G44.229 CHRONIC TENSION-TYPE HEADACHE, NOT INTRACTABLE: Primary | ICD-10-CM

## 2024-05-28 RX ORDER — NORTRIPTYLINE HCL 25 MG
25 CAPSULE ORAL AT BEDTIME
Qty: 90 CAPSULE | Refills: 3 | Status: SHIPPED | OUTPATIENT
Start: 2024-05-28

## 2024-05-29 ENCOUNTER — MYC MEDICAL ADVICE (OUTPATIENT)
Dept: NEUROLOGY | Facility: CLINIC | Age: 59
End: 2024-05-29
Payer: COMMERCIAL

## 2024-07-20 ENCOUNTER — HEALTH MAINTENANCE LETTER (OUTPATIENT)
Age: 59
End: 2024-07-20

## 2024-10-30 ENCOUNTER — LAB REQUISITION (OUTPATIENT)
Dept: LAB | Facility: CLINIC | Age: 59
End: 2024-10-30
Payer: COMMERCIAL

## 2024-10-30 DIAGNOSIS — E78.5 HYPERLIPIDEMIA, UNSPECIFIED: ICD-10-CM

## 2024-10-30 DIAGNOSIS — E03.8 OTHER SPECIFIED HYPOTHYROIDISM: ICD-10-CM

## 2024-10-30 DIAGNOSIS — Z13.1 ENCOUNTER FOR SCREENING FOR DIABETES MELLITUS: ICD-10-CM

## 2024-10-30 PROCEDURE — 80048 BASIC METABOLIC PNL TOTAL CA: CPT | Mod: ORL | Performed by: FAMILY MEDICINE

## 2024-10-30 PROCEDURE — 80061 LIPID PANEL: CPT | Mod: ORL | Performed by: FAMILY MEDICINE

## 2024-10-30 PROCEDURE — 84443 ASSAY THYROID STIM HORMONE: CPT | Mod: ORL | Performed by: FAMILY MEDICINE

## 2024-10-31 ENCOUNTER — LAB REQUISITION (OUTPATIENT)
Dept: LAB | Facility: CLINIC | Age: 59
End: 2024-10-31
Payer: COMMERCIAL

## 2024-10-31 DIAGNOSIS — Z12.5 ENCOUNTER FOR SCREENING FOR MALIGNANT NEOPLASM OF PROSTATE: ICD-10-CM

## 2024-10-31 LAB
ANION GAP SERPL CALCULATED.3IONS-SCNC: 10 MMOL/L (ref 7–15)
BUN SERPL-MCNC: 13 MG/DL (ref 6–20)
CALCIUM SERPL-MCNC: 8.9 MG/DL (ref 8.8–10.4)
CHLORIDE SERPL-SCNC: 102 MMOL/L (ref 98–107)
CHOLEST SERPL-MCNC: 160 MG/DL
CREAT SERPL-MCNC: 0.75 MG/DL (ref 0.67–1.17)
EGFRCR SERPLBLD CKD-EPI 2021: >90 ML/MIN/1.73M2
FASTING STATUS PATIENT QL REPORTED: NORMAL
FASTING STATUS PATIENT QL REPORTED: NORMAL
GLUCOSE SERPL-MCNC: 90 MG/DL (ref 70–99)
HCO3 SERPL-SCNC: 25 MMOL/L (ref 22–29)
HDLC SERPL-MCNC: 42 MG/DL
LDLC SERPL CALC-MCNC: 99 MG/DL
NONHDLC SERPL-MCNC: 118 MG/DL
POTASSIUM SERPL-SCNC: 4.1 MMOL/L (ref 3.4–5.3)
SODIUM SERPL-SCNC: 137 MMOL/L (ref 135–145)
TRIGL SERPL-MCNC: 93 MG/DL
TSH SERPL DL<=0.005 MIU/L-ACNC: 4.46 UIU/ML (ref 0.3–4.2)

## 2024-10-31 PROCEDURE — G0103 PSA SCREENING: HCPCS | Mod: ORL | Performed by: FAMILY MEDICINE

## 2024-11-02 LAB — PSA SERPL DL<=0.01 NG/ML-MCNC: 0.56 NG/ML (ref 0–3.5)

## 2024-12-12 ENCOUNTER — LAB REQUISITION (OUTPATIENT)
Dept: LAB | Facility: CLINIC | Age: 59
End: 2024-12-12
Payer: COMMERCIAL

## 2024-12-12 DIAGNOSIS — E03.8 OTHER SPECIFIED HYPOTHYROIDISM: ICD-10-CM

## 2024-12-12 PROCEDURE — 84443 ASSAY THYROID STIM HORMONE: CPT | Mod: ORL | Performed by: FAMILY MEDICINE

## 2024-12-13 LAB — TSH SERPL DL<=0.005 MIU/L-ACNC: 1.73 UIU/ML (ref 0.3–4.2)

## 2025-06-30 DIAGNOSIS — G44.229 CHRONIC TENSION-TYPE HEADACHE, NOT INTRACTABLE: ICD-10-CM

## 2025-07-01 RX ORDER — NORTRIPTYLINE HYDROCHLORIDE 25 MG/1
CAPSULE ORAL
Qty: 90 CAPSULE | Refills: 0 | Status: SHIPPED | OUTPATIENT
Start: 2025-07-01

## 2025-07-01 NOTE — TELEPHONE ENCOUNTER
Refill request for: Nortriptyline 25mg    Directions: Take 1 capsule at bedtime     LOV: 4/1/24  NOV: None - [employer] message sent to schedule     90 day supply with 0 refills Medication T'd for review and signature

## 2025-08-09 ENCOUNTER — HEALTH MAINTENANCE LETTER (OUTPATIENT)
Age: 60
End: 2025-08-09